# Patient Record
Sex: FEMALE | Race: BLACK OR AFRICAN AMERICAN | Employment: OTHER | URBAN - METROPOLITAN AREA
[De-identification: names, ages, dates, MRNs, and addresses within clinical notes are randomized per-mention and may not be internally consistent; named-entity substitution may affect disease eponyms.]

---

## 2022-04-01 ENCOUNTER — PREPPED CHART (OUTPATIENT)
Dept: URBAN - METROPOLITAN AREA CLINIC 7 | Facility: CLINIC | Age: 57
End: 2022-04-01

## 2022-06-23 RX ORDER — ALBUTEROL SULFATE 2.5 MG/3ML
SOLUTION RESPIRATORY (INHALATION)
COMMUNITY

## 2022-06-23 RX ORDER — HYDROCODONE POLISTIREX AND CHLORPHENIRAMINE POLISTIREX 10; 8 MG/5ML; MG/5ML
SUSPENSION, EXTENDED RELEASE ORAL
COMMUNITY

## 2022-06-23 RX ORDER — MONTELUKAST SODIUM 10 MG/1
TABLET ORAL
COMMUNITY

## 2022-06-23 RX ORDER — IBUPROFEN 800 MG/1
TABLET ORAL
COMMUNITY

## 2022-06-23 RX ORDER — ATENOLOL AND CHLORTHALIDONE TABLET 100; 25 MG/1; MG/1
TABLET ORAL
COMMUNITY

## 2022-06-23 RX ORDER — BENZONATATE 200 MG/1
1 CAPSULE ORAL
COMMUNITY
Start: 2018-11-26

## 2022-06-23 RX ORDER — FAMOTIDINE 40 MG/1
TABLET, FILM COATED ORAL
COMMUNITY
Start: 2020-04-16

## 2022-06-23 RX ORDER — LEVOTHYROXINE SODIUM 0.1 MG/1
TABLET ORAL
COMMUNITY

## 2022-06-23 RX ORDER — BUDESONIDE AND FORMOTEROL FUMARATE DIHYDRATE 160; 4.5 UG/1; UG/1
AEROSOL RESPIRATORY (INHALATION)
COMMUNITY
Start: 2019-05-24

## 2022-06-23 RX ORDER — ATORVASTATIN CALCIUM 40 MG/1
TABLET, FILM COATED ORAL
COMMUNITY

## 2022-06-23 RX ORDER — AMLODIPINE BESYLATE 10 MG/1
TABLET ORAL
COMMUNITY

## 2022-06-23 RX ORDER — ALBUTEROL SULFATE 90 UG/1
AEROSOL, METERED RESPIRATORY (INHALATION)
COMMUNITY

## 2022-06-23 RX ORDER — MELOXICAM 15 MG/1
TABLET ORAL
COMMUNITY

## 2022-06-23 RX ORDER — GLIMEPIRIDE 2 MG/1
TABLET ORAL
COMMUNITY

## 2022-06-23 RX ORDER — GUAIFENESIN AND CODEINE PHOSPHATE 100; 10 MG/5ML; MG/5ML
SOLUTION ORAL
COMMUNITY
Start: 2019-03-23

## 2022-06-30 RX ORDER — PROMETHAZINE HYDROCHLORIDE AND CODEINE PHOSPHATE 6.25; 1 MG/5ML; MG/5ML
SYRUP ORAL
COMMUNITY
Start: 2019-08-07

## 2022-06-30 RX ORDER — PREDNISONE 20 MG/1
TABLET ORAL
COMMUNITY

## 2022-06-30 RX ORDER — OMEPRAZOLE 40 MG/1
1 CAPSULE, DELAYED RELEASE ORAL
COMMUNITY

## 2022-06-30 RX ORDER — PREGABALIN 50 MG/1
CAPSULE ORAL
COMMUNITY

## 2022-09-28 ENCOUNTER — NEW PATIENT (OUTPATIENT)
Facility: LOCATION | Age: 57
End: 2022-09-28

## 2022-09-28 DIAGNOSIS — H52.03: ICD-10-CM

## 2022-09-28 DIAGNOSIS — H25.13: ICD-10-CM

## 2022-09-28 PROCEDURE — 92015 DETERMINE REFRACTIVE STATE: CPT

## 2022-09-28 PROCEDURE — 92004 COMPRE OPH EXAM NEW PT 1/>: CPT

## 2022-09-28 ASSESSMENT — KERATOMETRY
OS_AXISANGLE_DEGREES: 74
OD_AXISANGLE2_DEGREES: 90
OD_AXISANGLE_DEGREES: 0
OS_K1POWER_DIOPTERS: 48.75
OS_AXISANGLE2_DEGREES: 164
OS_K2POWER_DIOPTERS: 49.25
OD_K2POWER_DIOPTERS: 48.75
OD_K1POWER_DIOPTERS: 48.75

## 2022-09-28 ASSESSMENT — TONOMETRY
OD_IOP_MMHG: 16
OS_IOP_MMHG: 16

## 2022-09-28 ASSESSMENT — VISUAL ACUITY
OD_CC: 20/20
OU_CC: 20/20-1
OS_CC: 20/25+2

## 2024-03-20 ASSESSMENT — KERATOMETRY
OS_K2POWER_DIOPTERS: 49.25
OD_AXISANGLE_DEGREES: 0
OS_AXISANGLE_DEGREES: 74
OS_K1POWER_DIOPTERS: 48.75
OS_AXISANGLE2_DEGREES: 164
OD_K2POWER_DIOPTERS: 48.75
OD_K1POWER_DIOPTERS: 48.75
OD_AXISANGLE2_DEGREES: 90

## 2024-03-21 ENCOUNTER — ESTABLISHED PATIENT (OUTPATIENT)
Facility: LOCATION | Age: 59
End: 2024-03-21

## 2024-03-21 DIAGNOSIS — H52.03: ICD-10-CM

## 2024-03-21 DIAGNOSIS — E11.9: ICD-10-CM

## 2024-03-21 DIAGNOSIS — H25.13: ICD-10-CM

## 2024-03-21 PROCEDURE — 92015 DETERMINE REFRACTIVE STATE: CPT

## 2024-03-21 PROCEDURE — 92014 COMPRE OPH EXAM EST PT 1/>: CPT

## 2024-03-21 ASSESSMENT — TONOMETRY
OD_IOP_MMHG: 22
OS_IOP_MMHG: 20

## 2024-03-21 ASSESSMENT — VISUAL ACUITY
OS_CC: 20/30-1
OD_CC: 20/30-2
OU_CC: 20/25

## 2025-05-14 ENCOUNTER — COMPREHENSIVE EXAM (OUTPATIENT)
Age: 60
End: 2025-05-14

## 2025-05-14 DIAGNOSIS — H52.03: ICD-10-CM

## 2025-05-14 PROCEDURE — 92014 COMPRE OPH EXAM EST PT 1/>: CPT

## 2025-05-14 PROCEDURE — 92015 DETERMINE REFRACTIVE STATE: CPT

## 2025-07-17 NOTE — PATIENT DISCUSSION
Discussed lid hygiene, warm compress and eyelid wash. [Follow-Up Visit] : a follow-up visit for [FreeTextEntry2] : chronic leukopenia and neutropenia.